# Patient Record
Sex: FEMALE | Race: WHITE | NOT HISPANIC OR LATINO | Employment: UNEMPLOYED | URBAN - METROPOLITAN AREA
[De-identification: names, ages, dates, MRNs, and addresses within clinical notes are randomized per-mention and may not be internally consistent; named-entity substitution may affect disease eponyms.]

---

## 2017-01-03 ENCOUNTER — OFFICE VISIT (OUTPATIENT)
Dept: INTERNAL MEDICINE | Facility: MEDICAL CENTER | Age: 27
End: 2017-01-03
Payer: MEDICAID

## 2017-01-03 VITALS
WEIGHT: 145 LBS | RESPIRATION RATE: 16 BRPM | DIASTOLIC BLOOD PRESSURE: 70 MMHG | OXYGEN SATURATION: 95 % | HEART RATE: 108 BPM | HEIGHT: 69 IN | TEMPERATURE: 98.3 F | SYSTOLIC BLOOD PRESSURE: 138 MMHG | BODY MASS INDEX: 21.48 KG/M2

## 2017-01-03 DIAGNOSIS — G43.109 MIGRAINE WITH AURA AND WITHOUT STATUS MIGRAINOSUS, NOT INTRACTABLE: ICD-10-CM

## 2017-01-03 PROCEDURE — 99213 OFFICE O/P EST LOW 20 MIN: CPT | Mod: GE | Performed by: INTERNAL MEDICINE

## 2017-01-03 RX ORDER — BUTALBITAL, ACETAMINOPHEN AND CAFFEINE 300; 40; 50 MG/1; MG/1; MG/1
1 CAPSULE ORAL EVERY 4 HOURS PRN
Qty: 30 CAP | Refills: 0 | Status: SHIPPED | OUTPATIENT
Start: 2017-01-03

## 2017-01-03 ASSESSMENT — PATIENT HEALTH QUESTIONNAIRE - PHQ9: CLINICAL INTERPRETATION OF PHQ2 SCORE: 0

## 2017-01-03 NOTE — PROGRESS NOTES
"Established Patient    Josie presents today with the following:    CC: Requesting head CT    Acute Issues:   Headache with visual disturbance:  Patient states she gets migratory sharp headaches over the last several months.  She describes headache as sharp in nature and associated with temporary vision loss for a couple minutes.  She also reports \"lights look funky.\"  She denies unusual smells, nausea, vomiting, diaphoresis, weakness, numbness/tingling, diplopia, or photophobia.  Last episode occurred one month ago.  Reports ~4-5 episodes over the past three months.  No relationship to menstrual cycle, has IUD in place.  Denies family history of brain/head malignancy.  She further states there a component of anxiety, with sense of impending doom.  She also may get palpitations during this episode and had a complete negative cardiac workup with evaluation for cardiology within the last several months.      Preventative health care  Pap smear: Dr. Anat pap every 6 months.        Patient Active Problem List    Diagnosis Date Noted   • Dizziness 10/18/2016     Priority: Medium   • Near syncope 09/21/2016     Priority: Medium   • Migraine with aura and without status migrainosus, not intractable 01/03/2017       Current Outpatient Prescriptions   Medication Sig Dispense Refill   • acetaminophen/caffeine/butalbital 300-40-50 mg (FIORICET) -40 MG Cap capsule Take 1 Cap by mouth every four hours as needed for Headache. 30 Cap 0     No current facility-administered medications for this visit.       Allergies:  Allergies   Allergen Reactions   • Milk [Lac Bovis] Diarrhea     Digestion issues       ROS  A complete 12-system review of systems was obtained and is otherwise negative except as stated in the history of present illness, below, and/or the pre-visit questionnaire.        /70 mmHg  Pulse 108  Temp(Src) 36.8 °C (98.3 °F)  Resp 16  Ht 1.753 m (5' 9\")  Wt 65.772 kg (145 lb)  BMI 21.40 kg/m2  SpO2 " 95%  Breastfeeding? No    Physical Exam  General: No acute distress, non-toxic appearance.   HEENT: Extraocular muscles grossly intact, sclera clear. Moist oral mucosa without lesion.  No carotid bruits.  CVS: Physiologic S1 and S2. Regular rate and rhythm. No murmurs rubs or gallops appreciated. No JVD.   PULM: Clear to auscultation bilaterally, no adventitious sounds appreciated.  ABD: Soft, nontender, bowel sounds present, no peritoneal signs or guarding. I do not appreciate any organomegaly.  : No suprapubic tenderness. No CVA tenderness.   EXT: Pulses intact and equal, no lower extremity edema or clubbing/cyanosis.   Neuro: Cranial nerves II through XII are grossly intact, and without observed focal deficit.  Completely normal neurologic examination.   Pysch: AAOx4  Skin: No rashes.     Assessment and Plan    Problem List Items Addressed This Visit     Migraine with aura and without status migrainosus, not intractable     Symptoms sound consistent with migraine with aura.  Will try fioricet when she develops these symptoms which occur infrequently.  No neurologic deficit or history to suggest underlying malignancy in this young female.  If symptoms persist will consider further evaluation with imaging studies.         Relevant Medications    acetaminophen/caffeine/butalbital 300-40-50 mg (FIORICET) -40 MG Cap capsule            Follow-up:Return in about 4 months (around 5/3/2017).    Signed by: Ángel Martinez M.D.    Please note that this dictation was created using voice recognition software. I have made every reasonable attempt to correct obvious errors, but I expect that there are errors of grammar and possibly content that I did not discover before finalizing the note.

## 2017-01-03 NOTE — MR AVS SNAPSHOT
"        Josie Bansal   1/3/2017 10:00 AM   Office Visit   MRN: [id number]    Department:  [facility]   Dept Phone:  [phone]    Description:  Female : 1990   Provider:  Ángel Martinez M.D.           Reason for Visit     Brain Tumor requesting ct and mri    Toe Pain times one day      Allergies as of 1/3/2017     Allergen Noted Reactions    Milk [Lac Bovis] 2015   Diarrhea    Digestion issues      You were diagnosed with     Migraine with aura and without status migrainosus, not intractable   [[id number]]         Vital Signs     Blood Pressure Pulse Temperature Respirations Height Weight    138/70 mmHg 108 36.8 °C (98.3 °F) 16 1.753 m (5' 9\") 65.772 kg (145 lb)    Body Mass Index Oxygen Saturation Breastfeeding? Smoking Status          21.40 kg/m2 95% No Never Smoker         Basic Information     Date Of Birth Sex Race Ethnicity Preferred Language    1990 Female White Non- English      Your appointments     Mar 16, 2017  1:15 PM   Established Patient with Ángel Martinez M.D.   [facility] - Internal Medicine (--)    [address] [id number] [phone]           You will be receiving a confirmation call a few days before your appointment from our automated call confirmation system.              Problem List              ICD-10-CM Priority Class Noted - Resolved    Near syncope R55 Medium  2016 - Present    Dizziness R42 Medium  10/18/2016 - Present    Migraine with aura and without status migrainosus, not intractable G43.109   1/3/2017 - Present      Health Maintenance        Date Due Completion Dates    IMM VARICELLA (CHICKENPOX) VACCINE (1 of 2 - 2 Dose Adolescent Series) 3/10/2002 ---    IMM DTaP/Tdap/Td Vaccine (6 - Tdap) 5/10/2005 2005, 1994, 1993, 1990, 1990, 1990    PAP SMEAR 3/10/2010 ---    IMM INFLUENZA (1) 2016 ---            Current Immunizations     Dtap Vaccine 1994, " 1/13/1993, 1989, 1989, 1989    HIB Vaccine(PEDVAX) 1/13/1993    Hepatitis A Vaccine, Ped/Adol 5/9/2005, 11/25/2003    Hepatitis B Vaccine Non-Recombivax (Ped/Adol) 11/2/1999, 8/3/1999, 7/1/1999    IPV 9/20/1994, 1/13/1993, 1989, 1989    MMR Vaccine 9/20/1994, 6/15/1990    TD Vaccine 5/9/2005      Below and/or attached are the medications your provider expects you to take. Review all of your home medications and newly ordered medications with your provider and/or pharmacist. Follow medication instructions as directed by your provider and/or pharmacist. Please keep your medication list with you and share with your provider. Update the information when medications are discontinued, doses are changed, or new medications (including over-the-counter products) are added; and carry medication information at all times in the event of emergency situations     Allergies:  MILK - Diarrhea               Medications  Valid as of: January 03, 2017 - 10:49 AM    Generic Name Brand Name Tablet Size Instructions for use    Butalbital-APAP-Caffeine (Cap) FIORICET -40 MG Take 1 Cap by mouth every four hours as needed for Headache.        .                 Medicines prescribed today were sent to:     [facility] [address] [place] [id number]    Phone: [phone] Fax: [phone]    Open 24 Hours?: No      Medication refill instructions:       If your prescription bottle indicates you have medication refills left, it is not necessary to call your provider’s office. Please contact your pharmacy and they will refill your medication.    If your prescription bottle indicates you do not have any refills left, you may request refills at any time through one of the following ways: The online [employer] system (except Urgent Care), by calling your provider’s office, or by asking your pharmacy to contact your provider’s office with a refill request.  Medication refills are processed only during regular business hours and may not be available until the next business day. Your provider may request additional information or to have a follow-up visit with you prior to refilling your medication.   *Please Note: Medication refills are assigned a new Rx number when refilled electronically. Your pharmacy may indicate that no refills were authorized even though a new prescription for the same medication is available at the pharmacy. Please request the medicine by name with the pharmacy before contacting your provider for a refill.        Instructions    1. Headaches likely migraine in nature, may try Fioricet if you develop recurring symptoms.  If continues to be a problem with can further investigate at follow up appointments.          Key Ring Access Code: [email or web address]  Expires: 2/2/2017 10:05 AM    Key Ring  A secure, online tool to manage your health information     [employer]’s Key Ring® is a secure, online tool that connects you to your personalized health information from the privacy of your home -- day or night - making it very easy for you to manage your healthcare. Once the activation process is completed, you can even access your medical information using the Key Ring jonn, which is available for free in the Apple Jonn store or Google Play store.     Key Ring provides the following levels of access (as shown below):   My Chart Features   [facility] Doctor [facility]  Urgent  Care [facility]  Primary Care  Doctor   Email your healthcare team securely and privately 24/7 X X X    Manage appointments: schedule your next appointment; view details of past/upcoming appointments X      Request prescription refills. X      View recent personal medical records, including lab and immunizations X X X X   View health record, including health history, allergies, medications X X X X   Read reports about your outpatient visits, procedures, consult  and ER notes X X X X   See your discharge summary, which is a recap of your hospital and/or ER visit that includes your diagnosis, lab results, and care plan. X X       How to register for [employer]:  1. Go to  [email or web address].  2. Click on the Sign Up Now box, which takes you to the New Member Sign Up page. You will need to provide the following information:  a. Enter your [employer] Access Code exactly as it appears at the top of this page. (You will not need to use this code after you’ve completed the sign-up process. If you do not sign up before the expiration date, you must request a new code.)   b. Enter your date of birth.   c. Enter your home email address.   d. Click Submit, and follow the next screen’s instructions.  3. Create a [employer] ID. This will be your [employer] login ID and cannot be changed, so think of one that is secure and easy to remember.  4. Create a [employer] password. You can change your password at any time.  5. Enter your Password Reset Question and Answer. This can be used at a later time if you forget your password.   6. Enter your e-mail address. This allows you to receive e-mail notifications when new information is available in [employer].  7. Click Sign Up. You can now view your health information.    For assistance activating your [employer] account, call [phone]

## 2017-01-03 NOTE — PATIENT INSTRUCTIONS
1. Headaches likely migraine in nature, may try Fioricet if you develop recurring symptoms.  If continues to be a problem with can further investigate at follow up appointments.

## 2017-01-04 NOTE — ASSESSMENT & PLAN NOTE
Symptoms sound consistent with migraine with aura.  Will try fioricet when she develops these symptoms which occur infrequently.  No neurologic deficit or history to suggest underlying malignancy in this young female.  If symptoms persist will consider further evaluation with imaging studies.